# Patient Record
(demographics unavailable — no encounter records)

---

## 2024-10-16 NOTE — CONSULT LETTER
[FreeTextEntry1] : Dear CHARLES KIDD ,   I had the pleasure of seeing your patient, NOHEMY PASCAL, in my office today.  Please see my note below.  Thank you very much for allowing me to participate in the care of this patient. If you have any questions, please do not hesitate to contact me.  Sincerely,   Nilam Guillen Md EdM  , Pediatric Nephrology  [facility]

## 2024-10-16 NOTE — REVIEW OF SYSTEMS
Received request via: Pharmacy    Was the patient seen in the last year in this department? Yes    Does the patient have an active prescription (recently filled or refills available) for medication(s) requested? No    Does the patient have USP Plus and need 100 day supply (blood pressure, diabetes and cholesterol meds only)? Patient does not have SCP   [Negative] : Genitourinary [de-identified] : headaches

## 2024-10-16 NOTE — CONSULT LETTER
[FreeTextEntry1] : Dear CHARLES KIDD ,   I had the pleasure of seeing your patient, NOHEMY PASCAL, in my office today.  Please see my note below.  Thank you very much for allowing me to participate in the care of this patient. If you have any questions, please do not hesitate to contact me.  Sincerely,   Nilam Guillen Md EdM  , Pediatric Nephrology  [facility]

## 2024-12-10 NOTE — PHYSICAL EXAM
[FreeTextEntry1] :     GENERAL PHYSICAL EXAM: GEN: no acute distress, normal affect EYES:  sclera white, conjunctiva clear PULM: no respiratory distress, normal rate EXT: no edema, no cyanosis Cranial:  left temporal tenderness, no supraorbital, or occipital region tenderness. No popping or clicking of TMJ Neck: Normal ROM, bilateral trapezius muscle spasm, tenderness SKIN: warm, dry, no rash or lesion on exposed skin   NEUROLOGICAL EXAM: Mental Status Orientation: alert and oriented to person, place, time, and situation Language: clear and fluent, intact comprehension and repetition   Cranial Nerves II: visual fields full to confrontation III, IV, VI:  PERRL, EOMI, VFF, no nystagmus, no ptosis V, VII: facial sensation and movement intact and symmetric VIII: hearing intact to finger rub bilat IX, X: uvula midline, soft palate elevates normally XI: BL shoulder shrug intact, lateral head movement 5/5 bilat XII: tongue midline   Motor: Bilateral muscle strength 5/5 in UE and LE, proximally and distally, symmetric throughout Tone and bulk are normal in upper and lower limbs. No abnormal movements   Sensation: Intact to light touch and temp in all 4 EXTs,   Coordination: Normal FNF bilateral, Negative Romberg   Reflex: 2+ in BL biceps, brachioradialis, triceps, patella, Achilles. Babinski absent bilat   Gait Normal stance, stride, and pivot turn, Tandem walk intact

## 2024-12-10 NOTE — ASSESSMENT
[FreeTextEntry1] : 17 y/o young man hx of mild autism, benign, tremor, p/w headaches. CTH shows small arachnoid cyst posterior fossa. Headaches are holocephalic, pressure-like, photophobia but no nausea or phonophobia. Most likely episodic tension type headache.     Imaging not indicated at this time. If there is a change in headache features or patient's condition then will re-consider imaging.  Discussed that arachnoid cysts are usually benign. Can get follow up imaging in a year or two to ensure stability.    Acute: advil 400-600mg as needed.   Prevention: none indicated at this time.    Advised to keep headache journal to track headache frequency, triggers, and response to treatment.   Discussed common side effects of prescribed medications and potential alternatives.    Counseled patient on the importance of maintaining a healthy lifestyle, including balanced diet, adequate hydration, stress management, proper sleep hygiene, and physical activity       Answered all questions and concerns to the best of my ability. Advised to call for any new or worsening symptoms.    In order to maintain continuity of care/prescription refills, patients must be seen on a yearly basis.   Total time spent on the day of the visit, including pre-visit and post-visit time was 44 minutes.    Follow up as needed.

## 2024-12-10 NOTE — HISTORY OF PRESENT ILLNESS
[FreeTextEntry1] : CC:headache   Patient Referred by: self   HPI: 19 y/o young man, no significant PMH, pw/  headaches. Few months ago  he started having left temporal pain. Saw a neurologist in [place]. Because of palpitations and SOB he was sent to a cardiologist. He had echo which showed some mild regurgitation. He had a CT scan of the head which showed an arachnoid cyst. Since then his headaches are now the whole head a/w blurred vision. He used to follow with Dr. Kaiser, pediatric neurologist, for tremors. He also carries diagnosis of mild autism spectrum disorder. He has two young twin sisters who have ASD and one who had a ruptured cerebral AVM. Father with hx of tremors. He also complained of headaches at that time.   Location: holocephalic Quality: pressure, tightness Severity: moderate Disability:  none Duration: 1-2 hours Frequency: 2 days a week Temporal course: progressive Time of day: Pain Free between Attacks: days Triggers: eye strain Relieving factors:  Exacerbating factors: exercise and Valsalva maneuver do not make headache worse Prodrome or postdrome: none   Aura: none   Ass'd Symptoms: Nausea - Vomiting - Photophobia + Phonophobia: - Osmophobia - brain fog/difficulty concentrating - Irritability - Allodynia - Blurred Vision + Neck pain - Dizziness -   Autonomic features: none   Additional Social/Work History: Occupation: student  Treatment present: Acute: tylenol- not always effective.  Preventive:   Prior medications:    Non-pharmacologic Tx:   Imaging: CTH: cerebellar arachnoid cyst    Labs:   Childhood precursors:  none   FH: mother has migraine